# Patient Record
Sex: MALE | Race: WHITE | NOT HISPANIC OR LATINO | Employment: UNEMPLOYED | URBAN - METROPOLITAN AREA
[De-identification: names, ages, dates, MRNs, and addresses within clinical notes are randomized per-mention and may not be internally consistent; named-entity substitution may affect disease eponyms.]

---

## 2018-05-01 ENCOUNTER — HOSPITAL ENCOUNTER (EMERGENCY)
Facility: HOSPITAL | Age: 33
Discharge: HOME | End: 2018-05-01
Attending: EMERGENCY MEDICINE | Admitting: EMERGENCY MEDICINE

## 2018-05-01 ENCOUNTER — APPOINTMENT (OUTPATIENT)
Dept: GENERAL RADIOLOGY | Facility: HOSPITAL | Age: 33
End: 2018-05-01

## 2018-05-01 VITALS
SYSTOLIC BLOOD PRESSURE: 147 MMHG | HEART RATE: 75 BPM | WEIGHT: 180 LBS | BODY MASS INDEX: 26.66 KG/M2 | RESPIRATION RATE: 15 BRPM | TEMPERATURE: 98.2 F | DIASTOLIC BLOOD PRESSURE: 94 MMHG | HEIGHT: 69 IN | OXYGEN SATURATION: 98 %

## 2018-05-01 DIAGNOSIS — S61.211A LACERATION OF LEFT INDEX FINGER WITHOUT FOREIGN BODY WITHOUT DAMAGE TO NAIL, INITIAL ENCOUNTER: Primary | ICD-10-CM

## 2018-05-01 PROCEDURE — 12002 RPR S/N/AX/GEN/TRNK2.6-7.5CM: CPT | Performed by: EMERGENCY MEDICINE

## 2018-05-01 PROCEDURE — 99283 EMERGENCY DEPT VISIT LOW MDM: CPT

## 2018-05-01 PROCEDURE — 73140 X-RAY EXAM OF FINGER(S): CPT

## 2018-05-01 RX ORDER — CEPHALEXIN 500 MG/1
500 CAPSULE ORAL 4 TIMES DAILY
Qty: 28 CAPSULE | Refills: 0 | Status: SHIPPED | OUTPATIENT
Start: 2018-05-01 | End: 2018-05-01

## 2018-05-01 RX ORDER — CEPHALEXIN 500 MG/1
500 CAPSULE ORAL 3 TIMES DAILY
Qty: 15 CAPSULE | Refills: 0 | Status: SHIPPED | OUTPATIENT
Start: 2018-05-01 | End: 2018-05-06

## 2018-05-01 NOTE — DISCHARGE INSTRUCTIONS
Take antibiotics as directed.  Keep wound clean and protected with bandage.  Apply neosporin or bacitracin to the wound daily until it is healed.  Please return to the ER for any worsening pain, swelling, redness, fevers, drainage or any other concerns. Sutures must be removed in one week.

## 2018-05-01 NOTE — ED PROVIDER NOTES
Subjective   History of Present Illness  History of Present Illness    Chief complaint: Finger laceration    Location: Left index finger    Quality/Severity:  Moderate pain and bleeding    Timing/Duration: occurred about 30 min ago    Modifying Factors: none    Narrative: This patient presents for evaluation of an accidental finger laceration.  He was at home using some hedge trimmers to do some landscaping around the house.  He was holding the hedge tremors with his left hand and he went to set the total down.  Unfortunately the blade was still running when he set it down and the blades clipped him on the left index finger causing some immediate pain and bleeding.  There were no other fingers injured.  The patient put pressure on the wound stopped bleeding.  He says he is still able to move the finger appropriately.    Associated Symptoms: None    Review of Systems   Constitutional: Negative for activity change and fever.   HENT: Negative.    Respiratory: Negative for shortness of breath.    Cardiovascular: Negative for chest pain.   Gastrointestinal: Negative for abdominal pain.   Skin: Positive for wound. Negative for color change.   Neurological: Negative for syncope, weakness and numbness.   All other systems reviewed and are negative.      History reviewed. No pertinent past medical history.    No Known Allergies    Past Surgical History:   Procedure Laterality Date   • ORIF ULNA/RADIUS FRACTURES Left        History reviewed. No pertinent family history.    Social History     Social History   • Marital status:      Social History Main Topics   • Smoking status: Never Smoker   • Smokeless tobacco: Never Used   • Alcohol use No   • Drug use: No   • Sexual activity: Defer     Other Topics Concern   • Not on file       ED Triage Vitals   Temp Heart Rate Resp BP SpO2   05/01/18 1550 05/01/18 1550 05/01/18 1550 05/01/18 1550 05/01/18 1550   98.2 °F (36.8 °C) 78 16 154/94 98 %      Temp src Heart Rate Source  Patient Position BP Location FiO2 (%)   -- 05/01/18 1758 -- -- --    Monitor            Objective   Physical Exam   Constitutional: He is oriented to person, place, and time. He appears well-developed and well-nourished. No distress.   HENT:   Head: Normocephalic and atraumatic.   Eyes: EOM are normal. Pupils are equal, round, and reactive to light. Right eye exhibits no discharge. Left eye exhibits no discharge.   Neck: Normal range of motion. Neck supple.   Cardiovascular: Normal rate and intact distal pulses.    Pulmonary/Chest: Effort normal. No respiratory distress.   Musculoskeletal: Normal range of motion. He exhibits tenderness. He exhibits no edema or deformity.   There is a crescent shaped laceration notable to the lateral aspect of the distal second phalanx on the left hand.  This does not cross the DIP joint nor does it involve the nail.  Evaluation of the wound does not demonstrate any evidence of tendon or bone injury.  Mild oozing of dark red blood is present.  The fingertip is tender to touch but sensation is normal.  There are no foreign bodies retained within the wound.  Patient is able to flex and extend the index finger through full range of motion at the MCP, DIP and PIP joints appropriately.   Neurological: He is alert and oriented to person, place, and time.   Skin: Skin is warm and dry. No rash noted. He is not diaphoretic. No erythema.   Psychiatric: He has a normal mood and affect. His behavior is normal. Judgment and thought content normal.   Nursing note and vitals reviewed.      RADIOLOGY        Study: XR finger    Findings: negative    Interpreted contemporaneously with treatment by Dr. chisholm, independently viewed by me          Laceration Repair  Date/Time: 5/1/2018 5:10 PM  Performed by: YASIR ROSENBERG  Authorized by: YASIR ROSENBERG     Consent:     Consent obtained:  Verbal    Consent given by:  Patient    Risks discussed:  Infection and pain    Alternatives discussed:  No  treatment  Anesthesia (see MAR for exact dosages):     Anesthesia method:  Nerve block    Block location:  Left 2nd finger    Block needle gauge:  30 G    Block technique:  Digital block    Block injection procedure:  Anatomic landmarks identified, introduced needle, incremental injection and anatomic landmarks palpated    Block outcome:  Anesthesia achieved  Laceration details:     Location:  Finger    Finger location:  L index finger    Length (cm):  3  Repair type:     Repair type:  Simple  Pre-procedure details:     Preparation:  Patient was prepped and draped in usual sterile fashion and imaging obtained to evaluate for foreign bodies  Exploration:     Hemostasis achieved with:  Direct pressure    Wound exploration: wound explored through full range of motion and entire depth of wound probed and visualized      Wound extent: no foreign bodies/material noted, no nerve damage noted, no tendon damage noted, no underlying fracture noted and no vascular damage noted      Contaminated: no    Treatment:     Area cleansed with:  Saline    Amount of cleaning:  Extensive    Irrigation solution:  Sterile saline    Irrigation volume:  1000 mL    Irrigation method:  Pressure wash  Skin repair:     Repair method:  Sutures    Suture size:  4-0    Suture material:  Nylon    Suture technique:  Simple interrupted    Number of sutures:  3  Approximation:     Approximation:  Close  Post-procedure details:     Dressing:  Antibiotic ointment and non-adherent dressing    Patient tolerance of procedure:  Tolerated well, no immediate complications               ED Course  ED Course   Comment By Time   Patient presented for evaluation of a laceration to his left index finger.  X-rays were negative.  We irrigated the wound heavily and then repaired the laceration with 3 sutures.  Patient tolerated very well.  Discharged home with a 5 day prescription for Keflex.  Sutures should be removed in 7 days. Keyon Kennedy MD 05/01 2547                   MDM    ED Disposition     ED Disposition Condition Comment    Discharge Stable             Final diagnoses:   Laceration of left index finger without foreign body without damage to nail, initial encounter            Keyon Kennedy MD  05/01/18 4989